# Patient Record
Sex: FEMALE | Race: WHITE | NOT HISPANIC OR LATINO | URBAN - METROPOLITAN AREA
[De-identification: names, ages, dates, MRNs, and addresses within clinical notes are randomized per-mention and may not be internally consistent; named-entity substitution may affect disease eponyms.]

---

## 2018-02-04 ENCOUNTER — OFFICE VISIT (OUTPATIENT)
Dept: URGENT CARE | Facility: CLINIC | Age: 53
End: 2018-02-04
Payer: COMMERCIAL

## 2018-02-04 VITALS
OXYGEN SATURATION: 99 % | SYSTOLIC BLOOD PRESSURE: 145 MMHG | HEART RATE: 69 BPM | DIASTOLIC BLOOD PRESSURE: 88 MMHG | HEIGHT: 60 IN | RESPIRATION RATE: 18 BRPM | TEMPERATURE: 97 F | BODY MASS INDEX: 21.4 KG/M2 | WEIGHT: 109 LBS

## 2018-02-04 DIAGNOSIS — B02.9 HERPES ZOSTER WITHOUT COMPLICATION: Primary | ICD-10-CM

## 2018-02-04 PROCEDURE — 99213 OFFICE O/P EST LOW 20 MIN: CPT | Mod: S$GLB,,, | Performed by: FAMILY MEDICINE

## 2018-02-04 PROCEDURE — 3008F BODY MASS INDEX DOCD: CPT | Mod: S$GLB,,, | Performed by: FAMILY MEDICINE

## 2018-02-04 RX ORDER — AMOXICILLIN 875 MG/1
875 TABLET, FILM COATED ORAL
COMMUNITY
End: 2019-11-25

## 2018-02-04 RX ORDER — VALACYCLOVIR HYDROCHLORIDE 1 G/1
1000 TABLET, FILM COATED ORAL 3 TIMES DAILY
Qty: 21 TABLET | Refills: 0 | Status: SHIPPED | OUTPATIENT
Start: 2018-02-04 | End: 2018-02-11

## 2018-02-04 NOTE — PROGRESS NOTES
Subjective:       Patient ID: Markell Marin is a 53 y.o. female.    Vitals:  height is 5' (1.524 m) and weight is 49.4 kg (109 lb). Her oral temperature is 97.2 °F (36.2 °C). Her blood pressure is 145/88 (abnormal) and her pulse is 69. Her respiration is 18 and oxygen saturation is 99%.     Chief Complaint: Hand Pain and Rash    Hand Pain    The incident occurred 2 days ago. There was no injury mechanism. The pain is present in the left shoulder, left hand and left forearm. The quality of the pain is described as aching. The pain radiates to the left hand. The pain is at a severity of 8/10. The pain is moderate. The pain has been worsening since the incident. Associated symptoms include numbness. Pertinent negatives include no chest pain. Nothing aggravates the symptoms. She has tried acetaminophen for the symptoms. The treatment provided mild relief.   Rash   Associated symptoms include a fever and joint pain. Pertinent negatives include no diarrhea, shortness of breath, sore throat or vomiting.     Review of Systems   Constitution: Positive for fever. Negative for chills.   HENT: Negative for sore throat.    Eyes: Negative for blurred vision.   Cardiovascular: Negative for chest pain.   Respiratory: Negative for shortness of breath.    Skin: Positive for rash.   Musculoskeletal: Positive for joint pain and neck pain. Negative for back pain.   Gastrointestinal: Negative for abdominal pain, diarrhea, nausea and vomiting.   Neurological: Positive for numbness. Negative for headaches.   Psychiatric/Behavioral: The patient is not nervous/anxious.        Objective:      Physical Exam   Constitutional: She appears well-developed and well-nourished.   HENT:   Head: Normocephalic and atraumatic.   Cardiovascular: Normal rate, regular rhythm and normal heart sounds.    Skin: Rash (herpetic lesion on inner aspect of left arm extending from wrist to upper shoulder) noted.   Nursing note and vitals reviewed.      Assessment:        1. Herpes zoster without complication        Plan:         Herpes zoster without complication  -     valACYclovir (VALTREX) 1000 MG tablet; Take 1 tablet (1,000 mg total) by mouth 3 (three) times daily.  Dispense: 21 tablet; Refill: 0    Discussed skin care and risk of contagion

## 2018-02-04 NOTE — LETTER
February 4, 2018      [facility] [address], [address]  [place] [address]  Phone: [phone]  Fax: [phone]       Patient: Markell Marin   YOB: 1965  Date of Visit: 02/04/2018    To Whom It May Concern:    Jaleel Marin  was at [facility] on 02/04/2018. She may return to work/school on02/09/2018 with no restrictions. If you have any questions or concerns, or if I can be of further assistance, please do not hesitate to contact me. Before returning to work follow up with PCP.    Sincerely,    Marguerite Shaw MA

## 2018-02-04 NOTE — PATIENT INSTRUCTIONS
